# Patient Record
Sex: FEMALE | Race: WHITE | Employment: FULL TIME | URBAN - METROPOLITAN AREA
[De-identification: names, ages, dates, MRNs, and addresses within clinical notes are randomized per-mention and may not be internally consistent; named-entity substitution may affect disease eponyms.]

---

## 2024-11-15 ENCOUNTER — APPOINTMENT (OUTPATIENT)
Dept: CT IMAGING | Age: 44
DRG: 872 | End: 2024-11-15
Payer: COMMERCIAL

## 2024-11-15 ENCOUNTER — HOSPITAL ENCOUNTER (INPATIENT)
Age: 44
LOS: 2 days | Discharge: HOME | DRG: 872 | End: 2024-11-17
Attending: STUDENT IN AN ORGANIZED HEALTH CARE EDUCATION/TRAINING PROGRAM | Admitting: INTERNAL MEDICINE
Payer: COMMERCIAL

## 2024-11-15 DIAGNOSIS — K52.9 INFLAMMATORY BOWEL DISEASE: Primary | ICD-10-CM

## 2024-11-15 PROBLEM — K50.914: Status: ACTIVE | Noted: 2024-11-15

## 2024-11-15 LAB
ALBUMIN SERPL-MCNC: 4.4 G/DL (ref 3.4–5)
ALBUMIN/GLOB SERPL: 1.3 {RATIO} (ref 1.1–2.2)
ALP SERPL-CCNC: 74 U/L (ref 40–129)
ALT SERPL-CCNC: 12 U/L (ref 10–40)
ANION GAP SERPL CALCULATED.3IONS-SCNC: 13 MMOL/L (ref 3–16)
AST SERPL-CCNC: 15 U/L (ref 15–37)
BASOPHILS # BLD: 0 K/UL (ref 0–0.2)
BASOPHILS NFR BLD: 0.3 %
BILIRUB SERPL-MCNC: 0.5 MG/DL (ref 0–1)
BUN SERPL-MCNC: 13 MG/DL (ref 7–20)
CALCIUM SERPL-MCNC: 9.7 MG/DL (ref 8.3–10.6)
CHLORIDE SERPL-SCNC: 99 MMOL/L (ref 99–110)
CO2 SERPL-SCNC: 25 MMOL/L (ref 21–32)
CREAT SERPL-MCNC: 0.9 MG/DL (ref 0.6–1.1)
DEPRECATED RDW RBC AUTO: 12.2 % (ref 12.4–15.4)
EOSINOPHIL # BLD: 0.1 K/UL (ref 0–0.6)
EOSINOPHIL NFR BLD: 1.1 %
GFR SERPLBLD CREATININE-BSD FMLA CKD-EPI: 80 ML/MIN/{1.73_M2}
GLUCOSE SERPL-MCNC: 94 MG/DL (ref 70–99)
HCG UR QL: NEGATIVE
HCT VFR BLD AUTO: 38.6 % (ref 36–48)
HGB BLD-MCNC: 12.9 G/DL (ref 12–16)
LIPASE SERPL-CCNC: 34 U/L (ref 13–60)
LYMPHOCYTES # BLD: 2.7 K/UL (ref 1–5.1)
LYMPHOCYTES NFR BLD: 23.5 %
MCH RBC QN AUTO: 29.6 PG (ref 26–34)
MCHC RBC AUTO-ENTMCNC: 33.6 G/DL (ref 31–36)
MCV RBC AUTO: 88.2 FL (ref 80–100)
MONOCYTES # BLD: 0.5 K/UL (ref 0–1.3)
MONOCYTES NFR BLD: 4.2 %
NEUTROPHILS # BLD: 8.1 K/UL (ref 1.7–7.7)
NEUTROPHILS NFR BLD: 70.9 %
PLATELET # BLD AUTO: 419 K/UL (ref 135–450)
PMV BLD AUTO: 7.9 FL (ref 5–10.5)
POTASSIUM SERPL-SCNC: 3.8 MMOL/L (ref 3.5–5.1)
PROT SERPL-MCNC: 7.8 G/DL (ref 6.4–8.2)
RBC # BLD AUTO: 4.37 M/UL (ref 4–5.2)
SODIUM SERPL-SCNC: 137 MMOL/L (ref 136–145)
WBC # BLD AUTO: 11.4 K/UL (ref 4–11)

## 2024-11-15 PROCEDURE — 74177 CT ABD & PELVIS W/CONTRAST: CPT

## 2024-11-15 PROCEDURE — 96374 THER/PROPH/DIAG INJ IV PUSH: CPT

## 2024-11-15 PROCEDURE — 85025 COMPLETE CBC W/AUTO DIFF WBC: CPT

## 2024-11-15 PROCEDURE — 83690 ASSAY OF LIPASE: CPT

## 2024-11-15 PROCEDURE — 80053 COMPREHEN METABOLIC PANEL: CPT

## 2024-11-15 PROCEDURE — 6360000004 HC RX CONTRAST MEDICATION: Performed by: NURSE PRACTITIONER

## 2024-11-15 PROCEDURE — 99285 EMERGENCY DEPT VISIT HI MDM: CPT

## 2024-11-15 PROCEDURE — 96375 TX/PRO/DX INJ NEW DRUG ADDON: CPT

## 2024-11-15 PROCEDURE — 6360000002 HC RX W HCPCS: Performed by: NURSE PRACTITIONER

## 2024-11-15 PROCEDURE — 2580000003 HC RX 258: Performed by: NURSE PRACTITIONER

## 2024-11-15 PROCEDURE — 84703 CHORIONIC GONADOTROPIN ASSAY: CPT

## 2024-11-15 PROCEDURE — 1200000000 HC SEMI PRIVATE

## 2024-11-15 RX ORDER — MORPHINE SULFATE 4 MG/ML
4 INJECTION, SOLUTION INTRAMUSCULAR; INTRAVENOUS
Status: COMPLETED | OUTPATIENT
Start: 2024-11-15 | End: 2024-11-16

## 2024-11-15 RX ORDER — DICYCLOMINE HYDROCHLORIDE 10 MG/1
10 CAPSULE ORAL 4 TIMES DAILY PRN
Status: DISCONTINUED | OUTPATIENT
Start: 2024-11-15 | End: 2024-11-17 | Stop reason: HOSPADM

## 2024-11-15 RX ORDER — METRONIDAZOLE 500 MG/100ML
500 INJECTION, SOLUTION INTRAVENOUS EVERY 8 HOURS
Status: DISCONTINUED | OUTPATIENT
Start: 2024-11-16 | End: 2024-11-17 | Stop reason: HOSPADM

## 2024-11-15 RX ORDER — CIPROFLOXACIN 2 MG/ML
400 INJECTION, SOLUTION INTRAVENOUS EVERY 12 HOURS
Status: DISCONTINUED | OUTPATIENT
Start: 2024-11-16 | End: 2024-11-17 | Stop reason: HOSPADM

## 2024-11-15 RX ORDER — IOPAMIDOL 755 MG/ML
75 INJECTION, SOLUTION INTRAVASCULAR
Status: COMPLETED | OUTPATIENT
Start: 2024-11-15 | End: 2024-11-15

## 2024-11-15 RX ORDER — POTASSIUM CHLORIDE 1500 MG/1
40 TABLET, EXTENDED RELEASE ORAL PRN
Status: DISCONTINUED | OUTPATIENT
Start: 2024-11-15 | End: 2024-11-16

## 2024-11-15 RX ORDER — SODIUM CHLORIDE 9 MG/ML
INJECTION, SOLUTION INTRAVENOUS PRN
Status: DISCONTINUED | OUTPATIENT
Start: 2024-11-15 | End: 2024-11-17 | Stop reason: HOSPADM

## 2024-11-15 RX ORDER — ACETAMINOPHEN 325 MG/1
650 TABLET ORAL EVERY 6 HOURS PRN
Status: DISCONTINUED | OUTPATIENT
Start: 2024-11-15 | End: 2024-11-17 | Stop reason: HOSPADM

## 2024-11-15 RX ORDER — DEXTROAMPHETAMINE SACCHARATE, AMPHETAMINE ASPARTATE, DEXTROAMPHETAMINE SULFATE AND AMPHETAMINE SULFATE 7.5; 7.5; 7.5; 7.5 MG/1; MG/1; MG/1; MG/1
30 TABLET ORAL 2 TIMES DAILY
COMMUNITY

## 2024-11-15 RX ORDER — METRONIDAZOLE 500 MG/100ML
500 INJECTION, SOLUTION INTRAVENOUS ONCE
Status: COMPLETED | OUTPATIENT
Start: 2024-11-15 | End: 2024-11-15

## 2024-11-15 RX ORDER — ONDANSETRON 2 MG/ML
4 INJECTION INTRAMUSCULAR; INTRAVENOUS ONCE
Status: COMPLETED | OUTPATIENT
Start: 2024-11-15 | End: 2024-11-15

## 2024-11-15 RX ORDER — SODIUM CHLORIDE 0.9 % (FLUSH) 0.9 %
5-40 SYRINGE (ML) INJECTION PRN
Status: DISCONTINUED | OUTPATIENT
Start: 2024-11-15 | End: 2024-11-17 | Stop reason: HOSPADM

## 2024-11-15 RX ORDER — ONDANSETRON 4 MG/1
4 TABLET, ORALLY DISINTEGRATING ORAL EVERY 8 HOURS PRN
Status: DISCONTINUED | OUTPATIENT
Start: 2024-11-15 | End: 2024-11-17 | Stop reason: HOSPADM

## 2024-11-15 RX ORDER — MAGNESIUM SULFATE IN WATER 40 MG/ML
2000 INJECTION, SOLUTION INTRAVENOUS PRN
Status: DISCONTINUED | OUTPATIENT
Start: 2024-11-15 | End: 2024-11-16

## 2024-11-15 RX ORDER — CIPROFLOXACIN 2 MG/ML
400 INJECTION, SOLUTION INTRAVENOUS ONCE
Status: COMPLETED | OUTPATIENT
Start: 2024-11-15 | End: 2024-11-15

## 2024-11-15 RX ORDER — POTASSIUM CHLORIDE 7.45 MG/ML
10 INJECTION INTRAVENOUS PRN
Status: DISCONTINUED | OUTPATIENT
Start: 2024-11-15 | End: 2024-11-16

## 2024-11-15 RX ORDER — POLYETHYLENE GLYCOL 3350 17 G/17G
17 POWDER, FOR SOLUTION ORAL DAILY PRN
Status: DISCONTINUED | OUTPATIENT
Start: 2024-11-15 | End: 2024-11-17 | Stop reason: HOSPADM

## 2024-11-15 RX ORDER — MORPHINE SULFATE 4 MG/ML
4 INJECTION, SOLUTION INTRAMUSCULAR; INTRAVENOUS
Status: COMPLETED | OUTPATIENT
Start: 2024-11-15 | End: 2024-11-15

## 2024-11-15 RX ORDER — ACETAMINOPHEN 650 MG/1
650 SUPPOSITORY RECTAL EVERY 6 HOURS PRN
Status: DISCONTINUED | OUTPATIENT
Start: 2024-11-15 | End: 2024-11-17 | Stop reason: HOSPADM

## 2024-11-15 RX ORDER — SODIUM CHLORIDE 9 MG/ML
INJECTION, SOLUTION INTRAVENOUS CONTINUOUS
Status: ACTIVE | OUTPATIENT
Start: 2024-11-15 | End: 2024-11-16

## 2024-11-15 RX ORDER — ONDANSETRON 2 MG/ML
4 INJECTION INTRAMUSCULAR; INTRAVENOUS EVERY 6 HOURS PRN
Status: DISCONTINUED | OUTPATIENT
Start: 2024-11-15 | End: 2024-11-17 | Stop reason: HOSPADM

## 2024-11-15 RX ORDER — SODIUM CHLORIDE 0.9 % (FLUSH) 0.9 %
5-40 SYRINGE (ML) INJECTION EVERY 12 HOURS SCHEDULED
Status: DISCONTINUED | OUTPATIENT
Start: 2024-11-15 | End: 2024-11-17 | Stop reason: HOSPADM

## 2024-11-15 RX ORDER — ENOXAPARIN SODIUM 100 MG/ML
40 INJECTION SUBCUTANEOUS DAILY
Status: DISCONTINUED | OUTPATIENT
Start: 2024-11-15 | End: 2024-11-17 | Stop reason: HOSPADM

## 2024-11-15 RX ADMIN — SODIUM CHLORIDE: 9 INJECTION, SOLUTION INTRAVENOUS at 21:23

## 2024-11-15 RX ADMIN — CIPROFLOXACIN 400 MG: 2 INJECTION, SOLUTION INTRAVENOUS at 20:52

## 2024-11-15 RX ADMIN — ONDANSETRON 4 MG: 2 INJECTION INTRAMUSCULAR; INTRAVENOUS at 17:07

## 2024-11-15 RX ADMIN — IOPAMIDOL 75 ML: 755 INJECTION, SOLUTION INTRAVENOUS at 17:50

## 2024-11-15 RX ADMIN — MORPHINE SULFATE 4 MG: 4 INJECTION, SOLUTION INTRAMUSCULAR; INTRAVENOUS at 20:37

## 2024-11-15 RX ADMIN — ENOXAPARIN SODIUM 40 MG: 100 INJECTION SUBCUTANEOUS at 21:30

## 2024-11-15 RX ADMIN — METRONIDAZOLE 500 MG: 5 INJECTION, SOLUTION INTRAVENOUS at 22:03

## 2024-11-15 RX ADMIN — MORPHINE SULFATE 4 MG: 4 INJECTION, SOLUTION INTRAMUSCULAR; INTRAVENOUS at 22:33

## 2024-11-15 RX ADMIN — MORPHINE SULFATE 4 MG: 4 INJECTION, SOLUTION INTRAMUSCULAR; INTRAVENOUS at 17:07

## 2024-11-15 ASSESSMENT — PAIN - FUNCTIONAL ASSESSMENT
PAIN_FUNCTIONAL_ASSESSMENT: ACTIVITIES ARE NOT PREVENTED
PAIN_FUNCTIONAL_ASSESSMENT: 0-10

## 2024-11-15 ASSESSMENT — PAIN SCALES - GENERAL
PAINLEVEL_OUTOF10: 5
PAINLEVEL_OUTOF10: 8
PAINLEVEL_OUTOF10: 7
PAINLEVEL_OUTOF10: 5

## 2024-11-15 ASSESSMENT — PAIN DESCRIPTION - DESCRIPTORS
DESCRIPTORS: SHARP
DESCRIPTORS: SHARP

## 2024-11-15 ASSESSMENT — PAIN DESCRIPTION - LOCATION
LOCATION: ABDOMEN
LOCATION: ABDOMEN

## 2024-11-15 ASSESSMENT — PAIN DESCRIPTION - PAIN TYPE: TYPE: ACUTE PAIN

## 2024-11-15 ASSESSMENT — PAIN DESCRIPTION - ORIENTATION
ORIENTATION: RIGHT;ANTERIOR;MID
ORIENTATION: RIGHT;ANTERIOR;MID

## 2024-11-16 LAB
ANION GAP SERPL CALCULATED.3IONS-SCNC: 11 MMOL/L (ref 3–16)
BASOPHILS # BLD: 0 K/UL (ref 0–0.2)
BASOPHILS NFR BLD: 0.4 %
BUN SERPL-MCNC: 9 MG/DL (ref 7–20)
CALCIUM SERPL-MCNC: 8.8 MG/DL (ref 8.3–10.6)
CHLORIDE SERPL-SCNC: 102 MMOL/L (ref 99–110)
CO2 SERPL-SCNC: 24 MMOL/L (ref 21–32)
CREAT SERPL-MCNC: 0.8 MG/DL (ref 0.6–1.1)
CRP SERPL-MCNC: 17.6 MG/L (ref 0–5.1)
DEPRECATED RDW RBC AUTO: 12 % (ref 12.4–15.4)
EOSINOPHIL # BLD: 0.2 K/UL (ref 0–0.6)
EOSINOPHIL NFR BLD: 2.5 %
GFR SERPLBLD CREATININE-BSD FMLA CKD-EPI: >90 ML/MIN/{1.73_M2}
GLUCOSE SERPL-MCNC: 88 MG/DL (ref 70–99)
HCT VFR BLD AUTO: 34.9 % (ref 36–48)
HGB BLD-MCNC: 11.7 G/DL (ref 12–16)
LYMPHOCYTES # BLD: 3 K/UL (ref 1–5.1)
LYMPHOCYTES NFR BLD: 30.6 %
MAGNESIUM SERPL-MCNC: 2.2 MG/DL (ref 1.8–2.4)
MCH RBC QN AUTO: 29.4 PG (ref 26–34)
MCHC RBC AUTO-ENTMCNC: 33.5 G/DL (ref 31–36)
MCV RBC AUTO: 87.9 FL (ref 80–100)
MONOCYTES # BLD: 0.6 K/UL (ref 0–1.3)
MONOCYTES NFR BLD: 6.7 %
NEUTROPHILS # BLD: 5.8 K/UL (ref 1.7–7.7)
NEUTROPHILS NFR BLD: 59.8 %
PLATELET # BLD AUTO: 351 K/UL (ref 135–450)
PMV BLD AUTO: 8.1 FL (ref 5–10.5)
POTASSIUM SERPL-SCNC: 3.5 MMOL/L (ref 3.5–5.1)
RBC # BLD AUTO: 3.97 M/UL (ref 4–5.2)
SODIUM SERPL-SCNC: 137 MMOL/L (ref 136–145)
WBC # BLD AUTO: 9.7 K/UL (ref 4–11)

## 2024-11-16 PROCEDURE — 6360000002 HC RX W HCPCS: Performed by: NURSE PRACTITIONER

## 2024-11-16 PROCEDURE — 6370000000 HC RX 637 (ALT 250 FOR IP): Performed by: NURSE PRACTITIONER

## 2024-11-16 PROCEDURE — 80048 BASIC METABOLIC PNL TOTAL CA: CPT

## 2024-11-16 PROCEDURE — 85025 COMPLETE CBC W/AUTO DIFF WBC: CPT

## 2024-11-16 PROCEDURE — 6360000002 HC RX W HCPCS: Performed by: INTERNAL MEDICINE

## 2024-11-16 PROCEDURE — 2580000003 HC RX 258: Performed by: NURSE PRACTITIONER

## 2024-11-16 PROCEDURE — 86140 C-REACTIVE PROTEIN: CPT

## 2024-11-16 PROCEDURE — 2580000003 HC RX 258: Performed by: INTERNAL MEDICINE

## 2024-11-16 PROCEDURE — 1200000000 HC SEMI PRIVATE

## 2024-11-16 PROCEDURE — 36415 COLL VENOUS BLD VENIPUNCTURE: CPT

## 2024-11-16 PROCEDURE — 83735 ASSAY OF MAGNESIUM: CPT

## 2024-11-16 PROCEDURE — 6370000000 HC RX 637 (ALT 250 FOR IP): Performed by: INTERNAL MEDICINE

## 2024-11-16 RX ORDER — PROCHLORPERAZINE EDISYLATE 5 MG/ML
10 INJECTION INTRAMUSCULAR; INTRAVENOUS EVERY 6 HOURS PRN
Status: DISCONTINUED | OUTPATIENT
Start: 2024-11-16 | End: 2024-11-17 | Stop reason: HOSPADM

## 2024-11-16 RX ADMIN — SERTRALINE 50 MG: 50 TABLET, FILM COATED ORAL at 09:23

## 2024-11-16 RX ADMIN — ONDANSETRON 4 MG: 2 INJECTION INTRAMUSCULAR; INTRAVENOUS at 00:33

## 2024-11-16 RX ADMIN — SODIUM CHLORIDE, PRESERVATIVE FREE 10 ML: 5 INJECTION INTRAVENOUS at 22:02

## 2024-11-16 RX ADMIN — METRONIDAZOLE 500 MG: 500 INJECTION, SOLUTION INTRAVENOUS at 06:37

## 2024-11-16 RX ADMIN — METRONIDAZOLE 500 MG: 500 INJECTION, SOLUTION INTRAVENOUS at 23:45

## 2024-11-16 RX ADMIN — WATER 20 MG: 1 INJECTION INTRAMUSCULAR; INTRAVENOUS; SUBCUTANEOUS at 14:24

## 2024-11-16 RX ADMIN — PROCHLORPERAZINE EDISYLATE 10 MG: 5 INJECTION INTRAMUSCULAR; INTRAVENOUS at 12:12

## 2024-11-16 RX ADMIN — ONDANSETRON 4 MG: 2 INJECTION INTRAMUSCULAR; INTRAVENOUS at 09:32

## 2024-11-16 RX ADMIN — CIPROFLOXACIN 400 MG: 400 INJECTION, SOLUTION INTRAVENOUS at 09:29

## 2024-11-16 RX ADMIN — WATER 20 MG: 1 INJECTION INTRAMUSCULAR; INTRAVENOUS; SUBCUTANEOUS at 22:03

## 2024-11-16 RX ADMIN — METRONIDAZOLE 500 MG: 500 INJECTION, SOLUTION INTRAVENOUS at 14:33

## 2024-11-16 RX ADMIN — ACETAMINOPHEN 650 MG: 325 TABLET ORAL at 06:44

## 2024-11-16 RX ADMIN — SODIUM CHLORIDE, PRESERVATIVE FREE 10 ML: 5 INJECTION INTRAVENOUS at 09:45

## 2024-11-16 RX ADMIN — MORPHINE SULFATE 4 MG: 4 INJECTION, SOLUTION INTRAMUSCULAR; INTRAVENOUS at 00:34

## 2024-11-16 RX ADMIN — ENOXAPARIN SODIUM 40 MG: 100 INJECTION SUBCUTANEOUS at 09:23

## 2024-11-16 RX ADMIN — ACETAMINOPHEN, ASPIRIN, CAFFEINE 2 TABLET: 250; 65; 250 TABLET, FILM COATED ORAL at 12:14

## 2024-11-16 RX ADMIN — CIPROFLOXACIN 400 MG: 400 INJECTION, SOLUTION INTRAVENOUS at 22:06

## 2024-11-16 ASSESSMENT — PAIN - FUNCTIONAL ASSESSMENT
PAIN_FUNCTIONAL_ASSESSMENT: ACTIVITIES ARE NOT PREVENTED
PAIN_FUNCTIONAL_ASSESSMENT: ACTIVITIES ARE NOT PREVENTED

## 2024-11-16 ASSESSMENT — PAIN DESCRIPTION - DESCRIPTORS
DESCRIPTORS: SHARP
DESCRIPTORS: SHARP

## 2024-11-16 ASSESSMENT — PAIN DESCRIPTION - LOCATION
LOCATION: HEAD
LOCATION: ABDOMEN

## 2024-11-16 ASSESSMENT — PAIN DESCRIPTION - ORIENTATION
ORIENTATION: POSTERIOR
ORIENTATION: RIGHT;LOWER

## 2024-11-16 ASSESSMENT — PAIN SCALES - GENERAL: PAINLEVEL_OUTOF10: 4

## 2024-11-16 NOTE — ED NOTES
Aline Peraza is a 44 y.o. female admitted for  Principal Problem:    Abscess of intestine due to Crohn's disease (HCC)  Resolved Problems:    * No resolved hospital problems. *  .   Patient Home via self with   Chief Complaint   Patient presents with    Abdominal Pain     Abdominal pain that is right sided, pt states she has crohn's.    .  Patient is alert and Person, Place, Time, and Situation  Patient's baseline mobility: Baseline Mobility: Independent   Code Status: Full Code   Cardiac Rhythm:       Is patient on baseline Oxygen: no how many Liters:   Abnormal Assessment Findings: Abdominal pain, pain characteristics outside of typical pain associated with Crohn's disease    Isolation:       NIH Score:    C-SSRS: Risk of Suicide: No Risk  Bedside swallow:        Active LDA's:   Peripheral IV 11/15/24 Right Antecubital (Active)   Site Assessment Clean, dry & intact 11/15/24 1654     Patient admitted with a pa: no If the pa is chronic was it exchanged:NA  Reason for pa:   Patient admitted with Central Line:    . PICC line placement confirmed: YES OR NO:[id number]}   Reason for Central line:   Was central line Inserted from an outside facility:        Family/Caregiver Present no Any Concerns: no   Restraints no  Sitter no         Vitals:      Vitals:    11/15/24 1730 11/15/24 1738 11/15/24 1955 11/15/24 2040   BP:  (!) 150/101 132/84 (!) 131/91   Pulse: 94 89 83 82   Resp: 16 15 17 17   Temp:       SpO2: 100% 100% 99% 97%   Weight:       Height:           Last documented pain score (0-10 scale) Pain Level: 8  Pain medication administered Yes- see MAR.    Pertinent or High Risk Medications/Drips: Antibiotics started, second antibitoc due and sent with patient to inpatient room.    Pending Blood Product Administration: no    Abnormal labs:   Abnormal Labs Reviewed   CBC WITH AUTO DIFFERENTIAL - Abnormal; Notable for the following components:       Result Value    WBC 11.4 (*)     RDW 12.2 (*)     Neutrophils

## 2024-11-16 NOTE — CONSULTS
[facility]    GI Consult Note    Subjective:       Patient is a 44 y.o.  female admitted 11/15/2024 with Inflammatory bowel disease [K52.9]  Abscess of intestine due to Crohn's disease (HCC) [K50.914] who is seen in consult for abdominal pain, suspected Crohn's flare. She states that her Crohn's was diagnosed around 2013. She has been managed with dietary modifications alone and followed with Dr Clarke until he retired. Dr Torres has done her endoscopic procedures. She generally has pain in her RLQ when Crohn's flares. However over last week has had significant generalized abdominal pain. No change in Bms, fevers, vomiting, rectal bleeding. Presented to ER yesterday where CT showed 8 cm of active IBD in TI and small abscesses/sinus tracts near TI as well. Started on Cipro/Flagyl. GI consulted for further evaluation. Has been afebrile since admission. WBC 11.4->9.7.      Past Medical History:   Diagnosis Date    Anxiety     Crohn disease (HCC)     IBS (irritable bowel syndrome)     Migraine       No past surgical history on file.   Past Endoscopic History reviewed    Medications Prior to Admission: amphetamine-dextroamphetamine (ADDERALL) 30 MG tablet, Take 1 tablet by mouth 2 times daily. Max Daily Amount: 60 mg  sertraline (ZOLOFT) 50 MG tablet, Take 1 tablet by mouth daily  ondansetron (ZOFRAN ODT) 4 MG disintegrating tablet, Take 1 tablet by mouth every 8 hours as needed for Nausea  ibuprofen (ADVIL;MOTRIN) 600 MG tablet, Take 1 tablet by mouth 3 times daily as needed for Pain  ondansetron (ZOFRAN ODT) 4 MG disintegrating tablet, Take 1 tablet by mouth every 8 hours as needed for Nausea  dicyclomine (BENTYL) 10 MG capsule, Take 10 mg by mouth as needed     [unfilled]    No Known Allergies   Social History     Tobacco Use    Smoking status: Never    Smokeless tobacco: Never   Substance Use Topics    Alcohol use: No     Comment: occ      No family history on file.   Review of Systems  A

## 2024-11-16 NOTE — PLAN OF CARE
Problem: Pain  Goal: Verbalizes/displays adequate comfort level or baseline comfort level  Flowsheets (Taken 11/15/2024 2227)  Verbalizes/displays adequate comfort level or baseline comfort level:   Encourage patient to monitor pain and request assistance   Administer analgesics based on type and severity of pain and evaluate response   Assess pain using appropriate pain scale   Implement non-pharmacological measures as appropriate and evaluate response   Consider cultural and social influences on pain and pain management

## 2024-11-16 NOTE — ED PROVIDER NOTES
[facility]  ED  EMERGENCY DEPARTMENT ENCOUNTER        Pt Name: Aline Peraza  MRN: [id number]  Birthdate 1980  Date of evaluation: 11/15/2024  Provider: KAYLA Cook - CNP  PCP: Yonathan Giordano MD  Note Started: 7:35 PM EST 11/15/24       I have seen and evaluated this patient with my supervising physician Dr. Martinez      CHIEF COMPLAINT       Chief Complaint   Patient presents with    Abdominal Pain     Abdominal pain that is right sided, pt states she has crohn's.        HISTORY OF PRESENT ILLNESS: 1 or more Elements     History From: the patient  Limitations to history : None    Aline Peraza is a 44 y.o. female who presents to the ER today with complaints of Crohn's disease, abdominal pain.  Patient states that she has had flares in the past although has not had a flare recently.  She states that her Crohn's is normally managed by her PCP.  She said she has had pain for about a week now.  Here for further evaluation.    Nursing Notes were all reviewed and agreed with or any disagreements were addressed in the HPI.    REVIEW OF SYSTEMS :      Review of Systems    Positives and Pertinent negatives as per HPI.     SURGICAL HISTORY   No past surgical history on file.    CURRENTMEDICATIONS       Previous Medications    DICYCLOMINE (BENTYL) 10 MG CAPSULE    Take 10 mg by mouth as needed    IBUPROFEN (ADVIL;MOTRIN) 600 MG TABLET    Take 1 tablet by mouth 3 times daily as needed for Pain    ONDANSETRON (ZOFRAN ODT) 4 MG DISINTEGRATING TABLET    Take 1 tablet by mouth every 8 hours as needed for Nausea    ONDANSETRON (ZOFRAN ODT) 4 MG DISINTEGRATING TABLET    Take 1 tablet by mouth every 8 hours as needed for Nausea    SERTRALINE (ZOLOFT) 50 MG TABLET    Take 50 mg by mouth daily       ALLERGIES     Patient has no known allergies.    FAMILYHISTORY     No family history on file.     SOCIAL HISTORY       Social History     Tobacco Use    Smoking status: Never    Smokeless tobacco: Never

## 2024-11-16 NOTE — H&P
V2.0  History and Physical      Name:  Aline Peraza /Age/Sex: 1980  (44 y.o. female)   MRN & CSN:  [id number] & [id number] Encounter Date/Time: 11/15/2024 7:57 PM EST   Location:   PCP: Yonathan Giordano MD       Hospital Day: 1    Assessment and Plan:   Aline Peraza is a 44 y.o. female with a pmh of Crohn's disease who presents with Abscess of intestine due to Crohn's disease ([facility])    Hospital Problems             Last Modified POA    * (Principal) Abscess of intestine due to Crohn's disease ([facility]) 11/15/2024 Yes       Plan:  Crohn's disease with abscess  -CT abd in ED with inflammation near terminal ileum with adjacent abscesses and mesenteric lymphadenopathy  -GI consulted from ED - awaiting return call  -bowel rest  -IV Cipro, Flagyl  -IV fluids  -analgesia as needed    Disposition:   Current Living situation: home  Expected Disposition: home  Estimated D/C: 2-3 days but could be longer pending clinical course    Diet NPO   DVT Prophylaxis [x] Lovenox, []  Heparin, [] SCDs, [] Ambulation,  [] Eliquis, [] Xarelto, [] Coumadin   Code Status Full   Surrogate Decision Maker/ POA Royce Max     Personally reviewed Lab Studies and Imaging     Drugs that require monitoring for toxicity include IV antibiotics and the method of monitoring was daily CBC        History from:     patient, electronic medical record    History of Present Illness:     Chief Complaint: Abd pain  Aline Peraza is a 44 y.o. female with pmh of Crohn's disease who presents with abd pain. Patient is not currently on medication for her Crohn's disease. She follows with [facility]. Was a patient of Dr Tolbert who has now retired. She has a flare of her disease every few years, but this usually resolves with proper diet and supportive care. Has never been on a biologic or mesalamine.No history of stricture or fistula in the past. She is due for repeat colonoscopy which has not yet been scheduled.    Patient felt she was having  another disease flare 4-5 days ago when she began to have RLQ pain with nausea and decreased appetite. Symptoms did not resolve with her usual supportive care and in fact she seemed to be getting more weak and tired today thus prompting her presentation to the emergency department.    CT abd/pelvis consistent with active IBD involving 8cm segment of terminal ileum. Adjacent soft tissue density with internal gas, favored to represent small abscesses and/or sinus tracts. This is associated with mesenteric lymphadenopathy felt to be reactive. She was started on IV antibiotics, GI consulted and patient being admitted for further management.     Review of Systems:        Pertinent positives and negatives discussed in HPI     Objective:   No intake or output data in the 24 hours ending 11/15/24 2356   Vitals:   Vitals:    11/15/24 2040 11/15/24 2100 11/15/24 2233 11/15/24 2321   BP: (!) 131/91 (!) 147/94  139/89   Pulse: 82 75  70   Resp: 17 18 16 16   Temp:  97.9 °F (36.6 °C)  97.5 °F (36.4 °C)   TempSrc:  Oral  Oral   SpO2: 97% 96%  97%   Weight:       Height:           Personally Reviewed Medications Prior to Admission     Prior to Admission medications    Medication Sig Start Date End Date Taking? Authorizing Provider   amphetamine-dextroamphetamine (ADDERALL) 30 MG tablet Take 1 tablet by mouth 2 times daily. Max Daily Amount: 60 mg   Yes ProviderJeronimo MD   sertraline (ZOLOFT) 50 MG tablet Take 1 tablet by mouth daily   Yes Jeronimo Jordan MD   ondansetron (ZOFRAN ODT) 4 MG disintegrating tablet Take 1 tablet by mouth every 8 hours as needed for Nausea 8/27/22   Catalina Ureña APRN - CNP   ibuprofen (ADVIL;MOTRIN) 600 MG tablet Take 1 tablet by mouth 3 times daily as needed for Pain 8/27/22   Catalina Ureña APRN - CNP   ondansetron (ZOFRAN ODT) 4 MG disintegrating tablet Take 1 tablet by mouth every 8 hours as needed for Nausea 11/12/20   Susan Dowd APRN - CNP   dicyclomine (BENTYL) 10 MG

## 2024-11-16 NOTE — PLAN OF CARE
Problem: Pain  Goal: Verbalizes/displays adequate comfort level or baseline comfort level  11/16/2024 0944 by Jose Harris, RN  Outcome: Progressing  Flowsheets (Taken 11/16/2024 0944)  Verbalizes/displays adequate comfort level or baseline comfort level:   Encourage patient to monitor pain and request assistance   Administer analgesics based on type and severity of pain and evaluate response   Assess pain using appropriate pain scale  11/15/2024 2227 by Olivia Beck RN  Flowsheets (Taken 11/15/2024 2227)  Verbalizes/displays adequate comfort level or baseline comfort level:   Encourage patient to monitor pain and request assistance   Administer analgesics based on type and severity of pain and evaluate response   Assess pain using appropriate pain scale   Implement non-pharmacological measures as appropriate and evaluate response   Consider cultural and social influences on pain and pain management

## 2024-11-16 NOTE — CONSULTS
Called [facility] @ 1908  2nd call @ 1938  PER:  Tung Odell, APRN - CNP  RE:  inflammatory bowel disease  Jair Izquierdo MD responded @ 1941

## 2024-11-16 NOTE — ED PROVIDER NOTES
[facility]  ED  EMERGENCY DEPARTMENT ENCOUNTER        Patient Name: Aline Peraza  MRN: [id number]  Birthdate 1980  Date of evaluation: 11/15/2024  Provider: Roxanna Martinez MD  PCP: Yonathan Giordano MD  Note Started: 8:10 PM EST 11/15/24    I independently examined and evaluated Aline Peraza. I personally saw the patient and performed a substantive portion of the visit including all aspects of the medical decision making.  I made/approved the management plan and take responsibility for the patient management.  I am the primary physician of record.    CHIEF COMPLAINT  Abdominal pain       HISTORY OF PRESENT ILLNESS  History from : Patient    Limitations to history : None    In brief, Aline Peraza is a 44 y.o. female  has a past medical history of Anxiety, Crohn disease (HCC), IBS (irritable bowel syndrome), and Migraine., who presents to the ED complaining of abdominal pain.  Patient has a history of Crohn's disease.  She reports sharp right lower quadrant abdominal pain, states it feels different than her typical Crohn's.  She denies vomiting, diarrhea, fever, dysuria, vaginal bleeding or discharge.  Denies blood in her stool.      REVIEW OF SYSTEMS  All systems reviewed, pertinent positives per HPI otherwise noted to be negative.    Focused exam revealed   PHYSICAL EXAM  ED Triage Vitals   BP Systolic BP Percentile Diastolic BP Percentile Temp Temp src Pulse Respirations SpO2   11/15/24 1627 -- -- 11/15/24 1628 -- 11/15/24 1627 11/15/24 1628 11/15/24 1628   (!) 150/101   98.1 °F (36.7 °C)  99 16 98 %      Height Weight - Scale         11/15/24 1627 11/15/24 1627         1.651 m (5' 5\") 60.1 kg (132 lb 9.6 oz)           GENERAL APPEARANCE: Awake and alert. Cooperative. no distress.  HENT: Normocephalic. Atraumatic. Mucous membranes are moist  NECK: Supple.  Full range of motion of the neck without stiffness or pain.  EYES: PERRL. EOM's grossly intact.  HEART/CHEST: RRR. No murmurs.

## 2024-11-16 NOTE — ED NOTES
326 @ 2016   Detail Level: Zone Continue Regimen: Triamcinolone 0.025% ointment bid 14/7 PRN, Valtrex 3x daily for 1 week.  Eye drops Rx by Opthalmologist. Continue Regimen: Doxycycline 100 mg bid 7 days

## 2024-11-17 VITALS
TEMPERATURE: 98.3 F | HEART RATE: 98 BPM | WEIGHT: 132.6 LBS | RESPIRATION RATE: 18 BRPM | OXYGEN SATURATION: 95 % | SYSTOLIC BLOOD PRESSURE: 141 MMHG | DIASTOLIC BLOOD PRESSURE: 92 MMHG | BODY MASS INDEX: 22.09 KG/M2 | HEIGHT: 65 IN

## 2024-11-17 LAB
ALBUMIN SERPL-MCNC: 3.9 G/DL (ref 3.4–5)
ANION GAP SERPL CALCULATED.3IONS-SCNC: 14 MMOL/L (ref 3–16)
BUN SERPL-MCNC: 12 MG/DL (ref 7–20)
CALCIUM SERPL-MCNC: 9.2 MG/DL (ref 8.3–10.6)
CHLORIDE SERPL-SCNC: 101 MMOL/L (ref 99–110)
CO2 SERPL-SCNC: 22 MMOL/L (ref 21–32)
CREAT SERPL-MCNC: 0.7 MG/DL (ref 0.6–1.1)
DEPRECATED RDW RBC AUTO: 12 % (ref 12.4–15.4)
GFR SERPLBLD CREATININE-BSD FMLA CKD-EPI: >90 ML/MIN/{1.73_M2}
GLUCOSE SERPL-MCNC: 104 MG/DL (ref 70–99)
HCT VFR BLD AUTO: 36.6 % (ref 36–48)
HGB BLD-MCNC: 12.1 G/DL (ref 12–16)
MCH RBC QN AUTO: 29 PG (ref 26–34)
MCHC RBC AUTO-ENTMCNC: 33.1 G/DL (ref 31–36)
MCV RBC AUTO: 87.7 FL (ref 80–100)
PHOSPHATE SERPL-MCNC: 3.8 MG/DL (ref 2.5–4.9)
PLATELET # BLD AUTO: 431 K/UL (ref 135–450)
PMV BLD AUTO: 8 FL (ref 5–10.5)
POTASSIUM SERPL-SCNC: 3.8 MMOL/L (ref 3.5–5.1)
RBC # BLD AUTO: 4.17 M/UL (ref 4–5.2)
SODIUM SERPL-SCNC: 137 MMOL/L (ref 136–145)
WBC # BLD AUTO: 12.7 K/UL (ref 4–11)

## 2024-11-17 PROCEDURE — 36415 COLL VENOUS BLD VENIPUNCTURE: CPT

## 2024-11-17 PROCEDURE — 6360000002 HC RX W HCPCS: Performed by: NURSE PRACTITIONER

## 2024-11-17 PROCEDURE — 6360000002 HC RX W HCPCS: Performed by: INTERNAL MEDICINE

## 2024-11-17 PROCEDURE — 2580000003 HC RX 258: Performed by: INTERNAL MEDICINE

## 2024-11-17 PROCEDURE — 85027 COMPLETE CBC AUTOMATED: CPT

## 2024-11-17 PROCEDURE — 6370000000 HC RX 637 (ALT 250 FOR IP): Performed by: NURSE PRACTITIONER

## 2024-11-17 PROCEDURE — 80069 RENAL FUNCTION PANEL: CPT

## 2024-11-17 RX ORDER — METRONIDAZOLE 500 MG/1
500 TABLET ORAL 3 TIMES DAILY
Qty: 42 TABLET | Refills: 0 | Status: SHIPPED | OUTPATIENT
Start: 2024-11-17 | End: 2024-12-01

## 2024-11-17 RX ORDER — PREDNISONE 5 MG/1
TABLET ORAL
Qty: 252 TABLET | Refills: 0 | Status: SHIPPED | OUTPATIENT
Start: 2024-11-17

## 2024-11-17 RX ORDER — CIPROFLOXACIN 500 MG/1
500 TABLET, FILM COATED ORAL 2 TIMES DAILY
Qty: 28 TABLET | Refills: 0 | Status: SHIPPED | OUTPATIENT
Start: 2024-11-17 | End: 2024-12-01

## 2024-11-17 RX ADMIN — METRONIDAZOLE 500 MG: 500 INJECTION, SOLUTION INTRAVENOUS at 06:41

## 2024-11-17 RX ADMIN — WATER 20 MG: 1 INJECTION INTRAMUSCULAR; INTRAVENOUS; SUBCUTANEOUS at 06:34

## 2024-11-17 RX ADMIN — SERTRALINE 50 MG: 50 TABLET, FILM COATED ORAL at 10:08

## 2024-11-17 RX ADMIN — CIPROFLOXACIN 400 MG: 400 INJECTION, SOLUTION INTRAVENOUS at 10:11

## 2024-11-17 NOTE — PLAN OF CARE
Problem: Pain  Goal: Verbalizes/displays adequate comfort level or baseline comfort level  11/17/2024 0056 by Delores Zeng RN  Flowsheets (Taken 11/16/2024 0944 by Jose Harris RN)  Verbalizes/displays adequate comfort level or baseline comfort level:   Encourage patient to monitor pain and request assistance   Administer analgesics based on type and severity of pain and evaluate response   Assess pain using appropriate pain scale     Problem: Nutrition Deficit:  Goal: Optimize nutritional status  Outcome: Progressing  Flowsheets (Taken 11/17/2024 0056)  Nutrient intake appropriate for improving, restoring, or maintaining nutritional needs:   Assess nutritional status and recommend course of action   Monitor oral intake, labs, and treatment plans   Recommend appropriate diets, oral nutritional supplements, and vitamin/mineral supplements

## 2024-11-18 NOTE — DISCHARGE SUMMARY
Hospital Medicine Discharge Summary    Patient: Aline Peraza   : 1980     Admit Date: 11/15/2024   Discharge Date: 2024    Disposition:  [x]Home   []HHC  []SNF  []Acute Rehab  []LTAC  []Hospice  Code status:  [x]Full  []DNR/CCA  []Limited (DNR/CCA with Do Not Intubate)  []DNRCC  Condition at Discharge: Stable  Primary Care Provider: Yonathan Giordano MD    Admitting Provider: Pratima Olivier MD  Discharge Provider: Kartik Russell MD     Discharge Diagnoses:      Active Hospital Problems    Diagnosis     Abscess of intestine due to Crohn's disease (HCC) [K50.914]        Presenting Admission History:        \"Aline Peraza is a 44 y.o. female with pmh of Crohn's disease who presents with abd pain. Patient is not currently on medication for her Crohn's disease. She follows with [facility]. Was a patient of Dr Tolbert who has now retired. She has a flare of her disease every few years, but this usually resolves with proper diet and supportive care. Has never been on a biologic or mesalamine.No history of stricture or fistula in the past. She is due for repeat colonoscopy which has not yet been scheduled.     Patient felt she was having another disease flare 4-5 days ago when she began to have RLQ pain with nausea and decreased appetite. Symptoms did not resolve with her usual supportive care and in fact she seemed to be getting more weak and tired today thus prompting her presentation to the emergency department.     CT abd/pelvis consistent with active IBD involving 8cm segment of terminal ileum. Adjacent soft tissue density with internal gas, favored to represent small abscesses and/or sinus tracts. This is associated with mesenteric lymphadenopathy felt to be reactive. She was started on IV antibiotics, GI consulted and patient being admitted for further management\".     Assessment/Plan:           Crohn's disease - with abscess/fistula suspected on admission CT scan.  GI consulted and appreciated w/

## 2024-12-02 NOTE — PROGRESS NOTES
4 Eyes Skin Assessment and Patient belongings     The patient is being assess for  Admission    I agree that 2 Nurses have performed a thorough Head to Toe Skin Assessment on the patient. ALL assessment sites listed below have been assessed.       Areas assessed by both nurses: Aaron Hernandez RN / Nelli Beck RN    [x]   Head, Face, and Ears   [x]   Shoulders, Back, and Chest  [x]   Arms, Elbows, and Hands   [x]   Coccyx, Sacrum, and IschIum  [x]   Legs, Feet, and Heels        Does the Patient have Skin Breakdown?  No         Nicolas Prevention initiated:  Yes   Wound Care Orders initiated:  NA      [facility] nurse consulted for Pressure Injury (Stage 3,4, Unstageable, DTI, NWPT, and Complex wounds), New and Established Ostomies:  NA      I agree that 2 Nurses have reviewed patient belongings with the patient/family and documented in the flowsheet upon admission or transfer to the unit.     Belongings  Dental Appliances: None  Vision - Corrective Lenses: None  Hearing Aid: None  Clothing: Pants  Jewelry: Earrings, Ring  Body Piercings Removed: No  Electronic Devices: Cell Phone, , Other (Comment) (airpods)  Weapons (Notify Protective Services/Security): None  Home Medications: None  Valuables Given To: Patient  Provide Name(s) of Who Valuable(s) Were Given To: Aline Peraza       Nurse 1 eSignature: Electronically signed by Olivia Beck RN on 11/16/24 at 6:49 AM EST    **SHARE this note so that the co-signing nurse is able to place an eSignature**    Nurse 2 eSignature: {Esignature:[id number]}   
  4 Eyes Skin Assessment and Patient belongings     The patient is being assess for  Admission    I agree that 2 Nurses have performed a thorough Head to Toe Skin Assessment on the patient. ALL assessment sites listed below have been assessed.       Areas assessed by both nurses: Nelli Beck RN / Aaron Hernandez RN    [x]   Head, Face, and Ears   [x]   Shoulders, Back, and Chest  [x]   Arms, Elbows, and Hands   [x]   Coccyx, Sacrum, and IschIum  [x]   Legs, Feet, and Heels        Does the Patient have Skin Breakdown?  No         Nicolas Prevention initiated:  Yes   Wound Care Orders initiated:  NA      [facility] nurse consulted for Pressure Injury (Stage 3,4, Unstageable, DTI, NWPT, and Complex wounds), New and Established Ostomies:  NA      I agree that 2 Nurses have reviewed patient belongings with the patient/family and documented in the flowsheet upon admission or transfer to the unit.     Belongings  Dental Appliances: None  Vision - Corrective Lenses: None  Hearing Aid: None  Clothing: Pants  Jewelry: Earrings, Ring  Body Piercings Removed: No  Electronic Devices: Cell Phone, , Other (Comment) (airpods)  Weapons (Notify Protective Services/Security): None  Home Medications: None  Valuables Given To: Patient  Provide Name(s) of Who Valuable(s) Were Given To: Aline Peraza       Nurse 1 eSignature: Electronically signed by Olivia Beck RN on 11/15/24 at 10:29 PM EST    **SHARE this note so that the co-signing nurse is able to place an eSignature**    Nurse 2 eSignature: Electronically signed by Aaron Hernandez RN on 11/16/24 at 6:47 AM EST   
  Hospital Medicine Progress Note  V 10.25      Date of Admission: 11/15/2024    Hospital Day: 3      Chief Admission Complaint:   \"Abd pain\"     Subjective:  no new c/o    Presenting Admission History:         \"Aline Peraza is a 44 y.o. female with pmh of Crohn's disease who presents with abd pain. Patient is not currently on medication for her Crohn's disease. She follows with [facility]. Was a patient of Dr Tolbert who has now retired. She has a flare of her disease every few years, but this usually resolves with proper diet and supportive care. Has never been on a biologic or mesalamine.No history of stricture or fistula in the past. She is due for repeat colonoscopy which has not yet been scheduled.     Patient felt she was having another disease flare 4-5 days ago when she began to have RLQ pain with nausea and decreased appetite. Symptoms did not resolve with her usual supportive care and in fact she seemed to be getting more weak and tired today thus prompting her presentation to the emergency department.     CT abd/pelvis consistent with active IBD involving 8cm segment of terminal ileum. Adjacent soft tissue density with internal gas, favored to represent small abscesses and/or sinus tracts. This is associated with mesenteric lymphadenopathy felt to be reactive. She was started on IV antibiotics, GI consulted and patient being admitted for further management\".    Assessment/Plan:      Current Principal Problem:  Abscess of intestine due to Crohn's disease (HCC)      Crohn's disease - with abscess/fistula suspected on admission CT scan.  GI consulted and appreciated w/ recs for continued ABX and started on steroids.  PO/IV Narcotic analgesia as ordered. Diet advanced.  called at 1608 on 16 Nov @ [phone] and left brief VM/No answer. GI consulted and appreciated - IV steroids started and ABX continued w/ plan for 2 weeks tx and close outpt f/u.    N/V - possible ADR but could be underlying process.  
  Physician Progress Note      PATIENT:               EULA PRESCOTT  [facility] #:                  [id number]  :                       1980  ADMIT DATE:       11/15/2024 4:25 PM  DISCH DATE:        2024 4:00 PM  RESPONDING  PROVIDER #:        Kartik Russell MD          QUERY TEXT:    Patient admitted with Crohn's disease with abscess. Noted documentation of   sepsis in DS,  with WBC - 11.4, 9.7, Temp - 97-98.7. In order to support   the diagnosis of sepsis, please include additional clinical indicators in your   documentation.  Or please document if the diagnosis of sepsis has been ruled   out after further study    The medical record reflects the following:  Risk Factors: Crohn's disease with abscess    Clinical Indicators: H&P 11/15 \"Crohn's disease with abscess, CT abd in ED   with inflammation near terminal ileum with adjacent abscesses and mesenteric   lymphadenopathy, GI consulted from ED - awaiting return call, bowel rest, IV   Cipro, Flagyl, IV fluids\".    IM PN  \"Sepsis - w/ Leukocytosis/Tachycardia POArrival 2nd to above   infection.  Continue IVF as appropriate and monitor clinical response w/ ABX   as ordered\".    DS  \"Sepsis - w/ Leukocytosis/Tachycardia POArrival 2nd to above   infection.  Continue IVF as appropriate and monitor clinical response w/ ABX   as ordered\".    HR - 99, 94, 95, 100  Temp - 97-98.7  C-reactive protein - 17.6  WBC - 11.4, 9.7    Treatment: IV ciprofloxacin, Metronidazole, IVF, Blood culture    Thank you  ANTHONY Gonzalez CDS  Options provided:  -- Sepsis present as evidenced by, Please document evidence.  -- Sepsis was ruled out after study  -- Other - I will add my own diagnosis  -- Disagree - Not applicable / Not valid  -- Disagree - Clinically unable to determine / Unknown  -- Refer to Clinical Documentation Reviewer    PROVIDER RESPONSE TEXT:    Sepsis was ruled out after study.    Query created by: Hiren Yoon on 2024 7:24 
Admitted patient from ED, alert and oriented x 4. IV on right AC. Reports sharp pain on right lower quadrant of the abdomen extending to the mid area. Oriented to room. Side rails x 2. Call light within reach.  Per NP, patient may have sips of water and ice chips. Will continue to monitor.  
Assessment completed and documented. VSS. A/ox4. Denies pain and nausea. Bed locked and in lowest position. Bedside table and call light within reach. Denies further needs at this time.  
[facility]    GI Progress Note    Admit Date: 11/15/2024  CC:Crohn's exacerbation    Subjective:       Patient feeling well.  Feels steroids and ABX are helping. Abdominal pain has resolved.    Objective:       Patient Vitals for the past 24 hrs:   BP Temp Temp src Pulse Resp SpO2   11/17/24 0929 (!) 141/92 98.3 °F (36.8 °C) Oral 98 18 95 %   11/17/24 0429 139/83 98.2 °F (36.8 °C) Oral 100 16 95 %   11/16/24 2316 (!) 142/89 98.7 °F (37.1 °C) Oral 95 16 97 %   11/16/24 1936 -- -- Oral -- 16 --   11/16/24 1935 -- -- -- 87 -- 96 %       Exam:  Abdomen soft, NT, ND    Labs:  Recent Labs     11/15/24  1654 11/16/24  0422 11/17/24  0450   WBC 11.4* 9.7 12.7*   HGB 12.9 11.7* 12.1   HCT 38.6 34.9* 36.6   MCV 88.2 87.9 87.7    351 431     Recent Labs     11/15/24  1654 11/16/24  0422 11/17/24  0450    137 137   K 3.8 3.5 3.8   CL 99 102 101   CO2 25 24 22   PHOS  --   --  3.8   BUN 13 9 12   CREATININE 0.9 0.8 0.7     Recent Labs     11/15/24  1654   AST 15   ALT 12   BILITOT 0.5   ALKPHOS 74     Recent Labs     11/15/24  1654   LIPASE 34.0     No results for input(s): \"INR\" in the last 72 hours.    Invalid input(s): \"PROT\"  Invalid input(s): \"PTT\"  No results for input(s): \"OCCULTBLD\" in the last 72 hours.  Assessment:       Principal Problem:    Abscess of intestine due to Crohn's disease (HCC)  Resolved Problems:    * No resolved hospital problems. *        Abscess of intestine due to Crohn's disease (HCC)  Resolved Problems:    * No resolved hospital problems. *     Crohn's exacerbation with ileitis, possible small abscesses vs sinus tracts near TI on CT     Recommendations:   Would give 2 weeks of Cipro/Flagyl total  Discussed with Dr Mena in General Surgery who agrees with steroids as well  OK for dc today with prednisone taper--40 mg daily decreasing by 5 mg a week  Will schedule outpatient colonoscopy and GI follow up with me     
Pt A&O x4. Pt had period of nausea during day,was given mx as prescribed. Provider changed pt diet to reg low fat. Still need to acquire pt stool sample for calprotectin. V/s stable.   
Pt a/o x4. VSS. All prescriptions, discharge and follow up instructions given to the patient.  The patient verbalizes understanding and denies questions.  All belongings collected and sent with the patient. The patient was discharged off the unit by wheelchair and PCA in stable condition.  Electronically signed by EVANS JOHNSTON RN on 11/17/2024 at 4:39 PM   
Wt 60.1 kg (132 lb 9.6 oz)   LMP 11/08/2024   SpO2 96%   BMI 22.07 kg/m²     Telemetry:      Telemetry (Rhythm Strip) monitoring - Personally reviewed and interpreted telemetry (Rhythm Strip) on 11/16/2024 as ordered with the following findings      [x] NSR w/ controlled rate  [] Sinus Tachycardia w/ rate > 100  [] Sinus Bradycardia w/ rate < 60  [] Rate controlled Afib  [] Afib w/ RVR  [] Other -       Diet: ADULT DIET; Regular; GI [place] (GERD/Peptic Ulcer)    DVT Prophylaxis: [x]PPx LMWH  []SQ Heparin  []IPC/SCDs  []Eliquis  []Xarelto  []Coumadin  [] Heparin Drip  []Other -      Code status: Full Code    PT/OT Eval Status:   [x]NOT yet ordered  []Ordered and Pending   []Seen with Recommendations for:   []Home independently  []Home w/ assist  []HHC  []SNF  []Acute Rehab    Multi-Disciplinary Rounds with Case Management completed on 11/16/2024 with the following recommendations:  Anticipated Discharge Location: [x]Home w/ []HHC vs []SNF  []Acute Rehab  []LTAC  []Hospice  []Other -    Anticipated Discharge Day/Date:  Sunday/Monday 17/18 Nov  Barriers to Discharge: clinical course nd subspecialty recs.   --------------------------------------------------    MDM (any 2 required for High level billing)    A. Problems (any 1)  [x] Acute/Chronic Illness/injury posing ongoing threat to life and/or bodily function without ongoing treatment    [] Severe exacerbation of chronic illness    --------------------------------------------------  B. Risk of Treatment (any 1)    [x] Drugs/treatments that require intensive monitoring for toxicity    [] IV ABX (Vancomycin, Aminoglycosides, etc)     [] Post-Cath/Contrast study requiring serial monitoring    [x] IV Narcotic analgesia    [] Aggressive IV diuresis    [] Hypertonic Saline    [] Critical electrolyte abnormalities requiring IV replacement    [] Insulin - Scheduled/SSI or Insulin gtt    [] Anticoagulation (Heparin gtt or Coumadin - other anticoagulants in special